# Patient Record
Sex: MALE | Race: WHITE | NOT HISPANIC OR LATINO | Employment: OTHER | URBAN - METROPOLITAN AREA
[De-identification: names, ages, dates, MRNs, and addresses within clinical notes are randomized per-mention and may not be internally consistent; named-entity substitution may affect disease eponyms.]

---

## 2018-04-29 ENCOUNTER — HOSPITAL ENCOUNTER (EMERGENCY)
Facility: HOSPITAL | Age: 53
Discharge: HOME | End: 2018-04-29
Attending: EMERGENCY MEDICINE | Admitting: EMERGENCY MEDICINE

## 2018-04-29 VITALS
HEIGHT: 70 IN | TEMPERATURE: 97.9 F | DIASTOLIC BLOOD PRESSURE: 103 MMHG | RESPIRATION RATE: 16 BRPM | WEIGHT: 195 LBS | HEART RATE: 75 BPM | OXYGEN SATURATION: 96 % | SYSTOLIC BLOOD PRESSURE: 165 MMHG | BODY MASS INDEX: 27.92 KG/M2

## 2018-04-29 DIAGNOSIS — K08.89 PAIN, DENTAL: Primary | ICD-10-CM

## 2018-04-29 PROCEDURE — 96372 THER/PROPH/DIAG INJ SC/IM: CPT

## 2018-04-29 PROCEDURE — 25010000002 KETOROLAC TROMETHAMINE PER 15 MG: Performed by: NURSE PRACTITIONER

## 2018-04-29 PROCEDURE — 99284 EMERGENCY DEPT VISIT MOD MDM: CPT

## 2018-04-29 RX ORDER — KETOROLAC TROMETHAMINE 30 MG/ML
60 INJECTION, SOLUTION INTRAMUSCULAR; INTRAVENOUS ONCE
Status: COMPLETED | OUTPATIENT
Start: 2018-04-29 | End: 2018-04-29

## 2018-04-29 RX ORDER — FLUOXETINE HYDROCHLORIDE 20 MG/1
20 CAPSULE ORAL DAILY
COMMUNITY

## 2018-04-29 RX ADMIN — KETOROLAC TROMETHAMINE 60 MG: 30 INJECTION, SOLUTION INTRAMUSCULAR at 21:28

## 2018-04-29 RX ADMIN — LIDOCAINE HYDROCHLORIDE 10 ML: 20 SOLUTION ORAL; TOPICAL at 21:24

## 2018-04-29 RX ADMIN — TOPICAL ANESTHETIC 1 SPRAY: 200 SPRAY DENTAL; PERIODONTAL at 21:45

## 2023-02-08 ENCOUNTER — HOSPITAL ENCOUNTER (EMERGENCY)
Facility: HOSPITAL | Age: 58
Discharge: HOME | End: 2023-02-08
Attending: EMERGENCY MEDICINE | Admitting: EMERGENCY MEDICINE
Payer: COMMERCIAL

## 2023-02-08 ENCOUNTER — APPOINTMENT (OUTPATIENT)
Dept: GENERAL RADIOLOGY | Facility: HOSPITAL | Age: 58
End: 2023-02-08
Payer: COMMERCIAL

## 2023-02-08 VITALS
BODY MASS INDEX: 27.92 KG/M2 | DIASTOLIC BLOOD PRESSURE: 99 MMHG | SYSTOLIC BLOOD PRESSURE: 137 MMHG | WEIGHT: 195 LBS | OXYGEN SATURATION: 96 % | HEART RATE: 83 BPM | TEMPERATURE: 98.5 F | RESPIRATION RATE: 20 BRPM | HEIGHT: 70 IN

## 2023-02-08 DIAGNOSIS — S46.219A BICEPS TENDON TEAR: Primary | ICD-10-CM

## 2023-02-08 PROCEDURE — 99283 EMERGENCY DEPT VISIT LOW MDM: CPT

## 2023-02-08 PROCEDURE — 73070 X-RAY EXAM OF ELBOW: CPT

## 2023-02-08 RX ORDER — HYDROCODONE BITARTRATE AND ACETAMINOPHEN 5; 325 MG/1; MG/1
1 TABLET ORAL EVERY 6 HOURS PRN
Qty: 12 TABLET | Refills: 0 | Status: SHIPPED | OUTPATIENT
Start: 2023-02-08

## 2023-02-08 NOTE — ED PROVIDER NOTES
Subjective   History of Present Illness  57-year-old male presents emergency today lifting a door which was not exceptionally heavy but felt a pop in his left bicep area.  He had no direct trauma.  Patient reports this was done exceptionally heavy piece of equipment.  He is having spasm in the left bicep area.  He is unable to fully straighten out the left arm.  He is right-hand dominant.  As far as he knows he is never been on fluoroquinolone.  He has not been on steroids.    History provided by:  Patient   used: No    Arm Injury  Location:  Elbow  Elbow location:  L elbow  Injury: no    Pain details:     Quality:  Burning and cramping    Radiates to:  Does not radiate    Severity:  Moderate    Onset quality:  Sudden    Timing:  Constant    Progression:  Unchanged  Handedness:  Right-handed  Dislocation: no    Foreign body present:  No foreign bodies  Tetanus status:  Up to date  Prior injury to area:  No  Relieved by:  Rest  Worsened by:  Nothing  Ineffective treatments:  None tried  Associated symptoms: no back pain, no decreased range of motion, no fatigue, no fever, no muscle weakness, no neck pain, no numbness, no swelling and no tingling    Risk factors: no concern for non-accidental trauma        Review of Systems   Constitutional: Negative for fatigue and fever.   Respiratory: Negative for chest tightness, wheezing and stridor.    Cardiovascular: Negative for chest pain and palpitations.   Genitourinary: Negative for dysuria, frequency and urgency.   Musculoskeletal: Negative for back pain and neck pain.   Skin: Negative for pallor and rash.   Psychiatric/Behavioral: Negative.    All other systems reviewed and are negative.      Past Medical History:   Diagnosis Date   • Anxiety    • Depression        No Known Allergies    No past surgical history on file.    No family history on file.    Social History     Socioeconomic History   • Marital status: Single   Tobacco Use   • Smoking  status: Former   • Smokeless tobacco: Never   • Tobacco comments:     quit 5 years ago   Vaping Use   • Vaping Use: Never used   Substance and Sexual Activity   • Alcohol use: No     Comment: quit 6 years ago - recovering alcoholic   • Drug use: No   • Sexual activity: Defer           Objective   Physical Exam  Vitals and nursing note reviewed.   Constitutional:       Appearance: He is well-developed.   HENT:      Head: Normocephalic and atraumatic.      Right Ear: External ear normal.      Left Ear: External ear normal.      Nose: Nose normal.   Eyes:      General: No scleral icterus.     Conjunctiva/sclera: Conjunctivae normal.      Pupils: Pupils are equal, round, and reactive to light.   Neck:      Thyroid: No thyromegaly.   Cardiovascular:      Rate and Rhythm: Normal rate and regular rhythm.      Heart sounds: Normal heart sounds.   Pulmonary:      Effort: Pulmonary effort is normal. No respiratory distress.      Breath sounds: Normal breath sounds. No wheezing or rales.   Chest:      Chest wall: No tenderness.   Abdominal:      General: Bowel sounds are normal. There is no distension.      Palpations: Abdomen is soft.      Tenderness: There is no abdominal tenderness.   Musculoskeletal:         General: Normal range of motion.      Cervical back: Normal range of motion.   Lymphadenopathy:      Cervical: No cervical adenopathy.   Skin:     General: Skin is warm and dry.   Neurological:      Mental Status: He is alert and oriented to person, place, and time.      Cranial Nerves: No cranial nerve deficit.      Coordination: Coordination normal.      Deep Tendon Reflexes: Reflexes are normal and symmetric. Reflexes normal.   Psychiatric:         Behavior: Behavior normal.         Thought Content: Thought content normal.         Judgment: Judgment normal.         Procedures           ED Course                                 No results found for this or any previous visit (from the past 24 hour(s)).  Note: In  "addition to lab results from this visit, the labs listed above may include labs taken at another facility or during a different encounter within the last 24 hours. Please correlate lab times with ED admission and discharge times for further clarification of the services performed during this visit.    XR ELBOW 2 VIEW LEFT   Final Result   Impression:   No acute osseous abnormality      Electronically Signed: Leoncio Kenyon     2/8/2023 5:24 PM EST     Workstation ID: [id number]        Vitals:    02/08/23 1603   BP: 137/99   BP Location: Right arm   Patient Position: Sitting   Pulse: 83   Resp: 20   Temp: 98.5 °F (36.9 °C)   TempSrc: Oral   SpO2: 96%   Weight: 88.5 kg (195 lb)   Height: 177.8 cm (70\")     Medications - No data to display  ECG/EMG Results (last 24 hours)     ** No results found for the last 24 hours. **        No orders to display         GIULIANO reviewed by Jitendra Nelson MD       Medical Decision Making  Was lifting a pop in the left biceps area.  By examination appears of a biceps tendon tear.  The muscle appears to be balled up towards the proximal humerus.  There is no ecchymosis.  A defect is palpable.  Distal pulses are strong and equal limited range of motion.    Biceps tendon tear: acute illness or injury  Risk  Prescription drug management.          Final diagnoses:   Biceps tendon tear       ED Disposition  ED Disposition     ED Disposition   Discharge    Condition   Stable    Comment   --             Adriel Malloy MD  [address] [facility] [address]  [place] [id number]  [phone]               Medication List      New Prescriptions    HYDROcodone-acetaminophen 5-325 MG per tablet  Commonly known as: NORCO  Take 1 tablet by mouth Every 6 (Six) Hours As Needed for Severe Pain.           Where to Get Your Medications      These medications were sent to [facility] [id number] - [place], [place] - [address] AT [facility] [address] - [phone] PH - [phone] FX "  [address] [facility] [address], [place] [id number]    Phone: [phone]   · HYDROcodone-acetaminophen 5-325 MG per tablet          Dhaval Bennett PA  02/08/23 2105

## 2023-02-20 ENCOUNTER — LAB (OUTPATIENT)
Dept: LAB | Facility: HOSPITAL | Age: 58
End: 2023-02-20
Payer: COMMERCIAL

## 2023-02-20 ENCOUNTER — TRANSCRIBE ORDERS (OUTPATIENT)
Dept: CARDIOLOGY | Facility: HOSPITAL | Age: 58
End: 2023-02-20
Payer: COMMERCIAL

## 2023-02-20 ENCOUNTER — TRANSCRIBE ORDERS (OUTPATIENT)
Dept: LAB | Facility: HOSPITAL | Age: 58
End: 2023-02-20
Payer: COMMERCIAL

## 2023-02-20 ENCOUNTER — TRANSCRIBE ORDERS (OUTPATIENT)
Dept: ADMINISTRATIVE | Facility: HOSPITAL | Age: 58
End: 2023-02-20
Payer: COMMERCIAL

## 2023-02-20 ENCOUNTER — HOSPITAL ENCOUNTER (OUTPATIENT)
Dept: CARDIOLOGY | Facility: HOSPITAL | Age: 58
Discharge: HOME | End: 2023-02-20
Payer: COMMERCIAL

## 2023-02-20 ENCOUNTER — HOSPITAL ENCOUNTER (OUTPATIENT)
Dept: GENERAL RADIOLOGY | Facility: HOSPITAL | Age: 58
Discharge: HOME | End: 2023-02-20
Payer: COMMERCIAL

## 2023-02-20 DIAGNOSIS — Z87.898 PERSONAL HISTORY OF OTHER LYMPHATIC AND HEMATOPOIETIC NEOPLASM: ICD-10-CM

## 2023-02-20 DIAGNOSIS — Z01.811 PRE-OP CHEST EXAM: Primary | ICD-10-CM

## 2023-02-20 DIAGNOSIS — R73.09 IMPAIRED GLUCOSE TOLERANCE TEST: ICD-10-CM

## 2023-02-20 DIAGNOSIS — Z01.818 OTHER SPECIFIED PRE-OPERATIVE EXAMINATION: ICD-10-CM

## 2023-02-20 DIAGNOSIS — R94.31 EKG ABNORMALITIES: Primary | ICD-10-CM

## 2023-02-20 DIAGNOSIS — Z01.811 PRE-OP CHEST EXAM: ICD-10-CM

## 2023-02-20 DIAGNOSIS — Z87.898 PERSONAL HISTORY OF OTHER LYMPHATIC AND HEMATOPOIETIC NEOPLASM: Primary | ICD-10-CM

## 2023-02-20 LAB
ANION GAP SERPL CALCULATED.3IONS-SCNC: 8.7 MMOL/L (ref 5–15)
BUN SERPL-MCNC: 17 MG/DL (ref 6–20)
BUN/CREAT SERPL: 14.3 (ref 7–25)
CALCIUM SPEC-SCNC: 9.9 MG/DL (ref 8.6–10.5)
CHLORIDE SERPL-SCNC: 104 MMOL/L (ref 98–107)
CO2 SERPL-SCNC: 27.3 MMOL/L (ref 22–29)
CREAT SERPL-MCNC: 1.19 MG/DL (ref 0.76–1.27)
DEPRECATED RDW RBC AUTO: 40.8 FL (ref 37–54)
EGFRCR SERPLBLD CKD-EPI 2021: 71.2 ML/MIN/1.73
ERYTHROCYTE [DISTWIDTH] IN BLOOD BY AUTOMATED COUNT: 12.1 % (ref 12.3–15.4)
GLUCOSE SERPL-MCNC: 100 MG/DL (ref 65–99)
HBA1C MFR BLD: 5.4 % (ref 4.8–5.6)
HCT VFR BLD AUTO: 43.9 % (ref 37.5–51)
HGB BLD-MCNC: 15.4 G/DL (ref 13–17.7)
MCH RBC QN AUTO: 32.3 PG (ref 26.6–33)
MCHC RBC AUTO-ENTMCNC: 35.1 G/DL (ref 31.5–35.7)
MCV RBC AUTO: 92 FL (ref 79–97)
PLATELET # BLD AUTO: 283 10*3/MM3 (ref 140–450)
PMV BLD AUTO: 9.9 FL (ref 6–12)
POTASSIUM SERPL-SCNC: 4.6 MMOL/L (ref 3.5–5.2)
RBC # BLD AUTO: 4.77 10*6/MM3 (ref 4.14–5.8)
SODIUM SERPL-SCNC: 140 MMOL/L (ref 136–145)
WBC NRBC COR # BLD: 5.82 10*3/MM3 (ref 3.4–10.8)

## 2023-02-20 PROCEDURE — 36415 COLL VENOUS BLD VENIPUNCTURE: CPT

## 2023-02-20 PROCEDURE — 85027 COMPLETE CBC AUTOMATED: CPT

## 2023-02-20 PROCEDURE — 83036 HEMOGLOBIN GLYCOSYLATED A1C: CPT

## 2023-02-20 PROCEDURE — 71046 X-RAY EXAM CHEST 2 VIEWS: CPT

## 2023-02-20 PROCEDURE — 93005 ELECTROCARDIOGRAM TRACING: CPT

## 2023-02-20 PROCEDURE — 80048 BASIC METABOLIC PNL TOTAL CA: CPT

## 2023-02-22 LAB
QT INTERVAL: 388 MS
QTC INTERVAL: 430 MS

## 2023-03-02 DIAGNOSIS — S46.212A RUPTURE OF LEFT DISTAL BICEPS TENDON, INITIAL ENCOUNTER: Primary | ICD-10-CM

## 2023-03-02 RX ORDER — DOCUSATE SODIUM 100 MG/1
100 CAPSULE, LIQUID FILLED ORAL 2 TIMES DAILY
Qty: 20 CAPSULE | Refills: 0 | Status: SHIPPED | OUTPATIENT
Start: 2023-03-02

## 2023-03-02 RX ORDER — HYDROCODONE BITARTRATE AND ACETAMINOPHEN 7.5; 325 MG/1; MG/1
1 TABLET ORAL EVERY 4 HOURS PRN
Qty: 24 TABLET | Refills: 0 | Status: SHIPPED | OUTPATIENT
Start: 2023-03-02

## 2023-03-02 RX ORDER — ONDANSETRON 4 MG/1
4 TABLET, FILM COATED ORAL EVERY 8 HOURS PRN
Qty: 12 TABLET | Refills: 0 | Status: SHIPPED | OUTPATIENT
Start: 2023-03-02

## 2023-07-24 ENCOUNTER — TREATMENT (OUTPATIENT)
Dept: PHYSICAL THERAPY | Facility: CLINIC | Age: 58
End: 2023-07-24
Payer: COMMERCIAL

## 2023-07-24 DIAGNOSIS — G89.29 CHRONIC LOW BACK PAIN WITH LEFT-SIDED SCIATICA, UNSPECIFIED BACK PAIN LATERALITY: ICD-10-CM

## 2023-07-24 DIAGNOSIS — M54.42 CHRONIC LOW BACK PAIN WITH LEFT-SIDED SCIATICA, UNSPECIFIED BACK PAIN LATERALITY: ICD-10-CM

## 2023-07-24 DIAGNOSIS — M48.061 SPINAL STENOSIS OF LUMBAR REGION, UNSPECIFIED WHETHER NEUROGENIC CLAUDICATION PRESENT: Primary | ICD-10-CM

## 2023-07-24 PROCEDURE — G0283 ELEC STIM OTHER THAN WOUND: HCPCS | Performed by: PHYSICAL THERAPIST

## 2023-07-24 PROCEDURE — 97110 THERAPEUTIC EXERCISES: CPT | Performed by: PHYSICAL THERAPIST

## 2023-07-24 PROCEDURE — 97012 MECHANICAL TRACTION THERAPY: CPT | Performed by: PHYSICAL THERAPIST

## 2023-07-24 NOTE — PROGRESS NOTES
Physical Therapy Daily Treatment Note  [facility] [place]  [address]  [facility] [address]      Patient: Nicko Smith   : 1965  Diagnosis/ICD-10 Code:  Spinal stenosis of lumbar region, unspecified whether neurogenic claudication present [M48.061]  Referring practitioner: No ref. provider found  Date of Initial Visit: Type: THERAPY  Noted: 2023  Today's Date: 2023  Patient seen for 3 sessions         Visit Diagnoses:    ICD-10-CM ICD-9-CM   1. Spinal stenosis of lumbar region, unspecified whether neurogenic claudication present  M48.061 724.02   2. Chronic low back pain with left-sided sciatica, unspecified back pain laterality  M54.42 724.2    G89.29 724.3     338.29       Subjective   Nicko Smith reports: overall back is worsening, pain down both legs, but not past knees.  Still cannot push off with left foot.    Objective   OBSERVATION: atrophy of left calf.  GAIT: heel strike on left, but no toe off, has to rotate left leg.  TEST: RFIL=no pain during activity, peripheralization after standing. Prone on elbow: abolishes pain in this position, but when he stood pain increased in lumbar and down bilateral LE.  Prone LE traction, centralization of symptoms, and less painful when he stood up and walk.  See Exercise, Manual, and Modality Logs for complete treatment.       Assessment/Plan  Does not have an extension or flexion preference at this time.  End ranges of both flexion and extension peripheralization of symptoms.  Responds well to prone traction. Suspect a lumbar disk derangement and/or foraminal stenosis.  Need to send an update to his referring provider.  Progress per Plan of Care and Progress strengthening /stabilization /functional activity           Timed:         Manual Therapy:         mins  07081;     Therapeutic Exercise:    15     mins  25163;     Neuromuscular Kaite:        mins  55791;    Therapeutic Activity:          mins  45612;     Gait  Training:           mins  96751;     Ultrasound:          mins  24982;    Ionto                                   mins   66445  Self Care                            mins   55874  Canalith Repos         mins 03469      Un-Timed:  Electrical Stimulation:    30     mins  53446 ( );  Dry Needling          mins self-pay  Traction     20     mins 91326      Timed Treatment:   15   mins   Total Treatment:     65   mins    Johnathon Buck, PT  [place] License: [id number]

## 2023-07-24 NOTE — LETTER
Progress Note  2023  MARIO Caba    Re: Nicko Smith : 1965  ________________________________________________________________    Nicko Smith, has attended 3/3 PT sessions.  Treatment has consisted of: Lumbar AROM/AAROM/Stretching, lumbar manual mobilization, traction, modalities.    S:  Nicko Smith states: Feels like back and leg symptoms progressively worsening.  Still has no strength in left leg to push off with.    O:  OBSERVATION: Atrophy of left calf.  AROM: Lumbar   Flexion: 95 (More painful to come up) degrees   Extension: 25 degrees, end range pain with increase in LE symptoms.  Left lateral flexion: 30 degrees   Right lateral flexion: 35 degrees     MMT: Left calf 2+/5  DTR's/SENSATION: Left achilles sluggish.  SPECIAL TESTS:  Lumbar Quadrant test positive to left.  ACTIVITY TOLERANCE: The longer he stands up right the more severe his LE symptoms worsen.     A: Patient does not a direction of preference for centralization of symptoms, both flexion and extension causes peripheralization of symptoms. Traction of spine helps for short period time.  Pt may benefit from more advance imaging, or injection therapies.  I hope this information helps.      P: Please advise after your exam.    Thank you for this referral.    Johnathon Buck, PT

## 2023-07-26 ENCOUNTER — TREATMENT (OUTPATIENT)
Dept: PHYSICAL THERAPY | Facility: CLINIC | Age: 58
End: 2023-07-26
Payer: COMMERCIAL

## 2023-07-26 DIAGNOSIS — M48.061 SPINAL STENOSIS OF LUMBAR REGION, UNSPECIFIED WHETHER NEUROGENIC CLAUDICATION PRESENT: Primary | ICD-10-CM

## 2023-07-26 DIAGNOSIS — M54.42 CHRONIC LOW BACK PAIN WITH LEFT-SIDED SCIATICA, UNSPECIFIED BACK PAIN LATERALITY: ICD-10-CM

## 2023-07-26 DIAGNOSIS — G89.29 CHRONIC LOW BACK PAIN WITH LEFT-SIDED SCIATICA, UNSPECIFIED BACK PAIN LATERALITY: ICD-10-CM

## 2023-07-26 NOTE — PROGRESS NOTES
Physical Therapy Daily Treatment Note  [facility] [place] [facility] [place]  [address]  [facility] [address]      Patient: Nicko Smith   : 1965  Diagnosis/ICD-10 Code:  Spinal stenosis of lumbar region, unspecified whether neurogenic claudication present [M48.061]  Referring practitioner: No ref. provider found  Date of Initial Visit: Type: THERAPY  Noted: 2023  Today's Date: 2023  Patient seen for 4 sessions         Visit Diagnoses:    ICD-10-CM ICD-9-CM   1. Spinal stenosis of lumbar region, unspecified whether neurogenic claudication present  M48.061 724.02   2. Chronic low back pain with left-sided sciatica, unspecified back pain laterality  M54.42 724.2    G89.29 724.3     338.29       Subjective   Nicko Smith reports: no big change up to this point, has not been back to see MD. Back pain is the biggest issue.    Objective   GAIT: antalgic to the left  OBSERVATION: atrophy left calf.  OTHER: end range extension and flexion causes peripheralization.  Repeated motion without reaching end range tolerated well with some improvement.  See Exercise, Manual, and Modality Logs for complete treatment.       Assessment/Plan  Seems to do well with quadraped flexion into extension.  Manual therapy tolerated, want to see how he did with soft tissue treatment.  Progress per Plan of Care and Progress strengthening /stabilization /functional activity           Timed:         Manual Therapy:    17     mins  00355;     Therapeutic Exercise:    15     mins  21537;     Neuromuscular Katie:        mins  21677;    Therapeutic Activity:     16     mins  87463;     Gait Training:           mins  43256;     Ultrasound:          mins  02581;    Ionto                                   mins   31208  Self Care                            mins   41445  Canalith Repos         mins 46250      Un-Timed:  Electrical Stimulation:    20     mins  72152 ( );  Dry Needling          mins self-pay  Traction           mins 12077      Timed Treatment:   48   mins   Total Treatment:     68   mins    Johnathon Buck, PT  [place] License: [id number]

## 2023-08-09 ENCOUNTER — TREATMENT (OUTPATIENT)
Dept: PHYSICAL THERAPY | Facility: CLINIC | Age: 58
End: 2023-08-09
Payer: COMMERCIAL

## 2023-08-09 DIAGNOSIS — M54.42 CHRONIC LOW BACK PAIN WITH LEFT-SIDED SCIATICA, UNSPECIFIED BACK PAIN LATERALITY: ICD-10-CM

## 2023-08-09 DIAGNOSIS — M48.061 SPINAL STENOSIS OF LUMBAR REGION, UNSPECIFIED WHETHER NEUROGENIC CLAUDICATION PRESENT: Primary | ICD-10-CM

## 2023-08-09 DIAGNOSIS — G89.29 CHRONIC LOW BACK PAIN WITH LEFT-SIDED SCIATICA, UNSPECIFIED BACK PAIN LATERALITY: ICD-10-CM

## 2023-08-09 PROCEDURE — G0283 ELEC STIM OTHER THAN WOUND: HCPCS | Performed by: PHYSICAL THERAPIST

## 2023-08-09 PROCEDURE — 97110 THERAPEUTIC EXERCISES: CPT | Performed by: PHYSICAL THERAPIST

## 2023-08-09 PROCEDURE — 97140 MANUAL THERAPY 1/> REGIONS: CPT | Performed by: PHYSICAL THERAPIST

## 2023-08-10 NOTE — PROGRESS NOTES
Physical Therapy Daily Treatment Note  [facility] [place]  [address]  [facility] [address]      Patient: Nicko Smith   : 1965  Diagnosis/ICD-10 Code:  Spinal stenosis of lumbar region, unspecified whether neurogenic claudication present [M48.061]  Referring practitioner: MARIO Caba  Date of Initial Visit: Type: THERAPY  Noted: 2023  Today's Date: 2023  Patient seen for 5 sessions         Visit Diagnoses:    ICD-10-CM ICD-9-CM   1. Spinal stenosis of lumbar region, unspecified whether neurogenic claudication present  M48.061 724.02   2. Chronic low back pain with left-sided sciatica, unspecified back pain laterality  M54.42 724.2    G89.29 724.3     338.29       Subjective   Nicko Smith reports: Press ups really causes a lot of leg pain, too much lumbar flexion causes leg pain too.    Objective   OBSERVATION: atrophy left calf  GAIT: cannot push off with left foot.  PALPATION: tender L4 to S1 left > right.  RMT: if he slowly goes at REIL he can increase his extension ROM, but cannot get to end range without symptoms exacerbation.    See Exercise, Manual, and Modality Logs for complete treatment.       Assessment/Plan  Flexion rotation tolerated well, cannot go to end range lumbar extension. I suspect a neural foraminal stenosis of some level.  He has to do most activities in a neutral position. Lumbar distraction is helpful.  Progress per Plan of Care and Progress strengthening /stabilization /functional activity           Timed:         Manual Therapy:    13     mins  24769;     Therapeutic Exercise:    25     mins  88146;     Neuromuscular Katie:        mins  97291;    Therapeutic Activity:          mins  35355;     Gait Training:           mins  59330;     Ultrasound:          mins  22278;    Ionto                                   mins   32905  Self Care                            mins   82690  [facility]         mins  46819      Un-Timed:  Electrical Stimulation:    20     mins  29838 ( );  Dry Needling          mins self-pay  Traction          mins 99480      Timed Treatment:   38   mins   Total Treatment:     58   mins    Johnathon Buck, PT  [place] License: [id number]

## 2023-08-15 ENCOUNTER — TREATMENT (OUTPATIENT)
Dept: PHYSICAL THERAPY | Facility: CLINIC | Age: 58
End: 2023-08-15
Payer: COMMERCIAL

## 2023-08-15 DIAGNOSIS — G89.29 CHRONIC LOW BACK PAIN WITH LEFT-SIDED SCIATICA, UNSPECIFIED BACK PAIN LATERALITY: ICD-10-CM

## 2023-08-15 DIAGNOSIS — M48.061 SPINAL STENOSIS OF LUMBAR REGION, UNSPECIFIED WHETHER NEUROGENIC CLAUDICATION PRESENT: Primary | ICD-10-CM

## 2023-08-15 DIAGNOSIS — M54.42 CHRONIC LOW BACK PAIN WITH LEFT-SIDED SCIATICA, UNSPECIFIED BACK PAIN LATERALITY: ICD-10-CM

## 2023-08-15 PROCEDURE — G0283 ELEC STIM OTHER THAN WOUND: HCPCS | Performed by: PHYSICAL THERAPIST

## 2023-08-15 PROCEDURE — 97140 MANUAL THERAPY 1/> REGIONS: CPT | Performed by: PHYSICAL THERAPIST

## 2023-08-15 PROCEDURE — 97110 THERAPEUTIC EXERCISES: CPT | Performed by: PHYSICAL THERAPIST

## 2023-08-17 NOTE — PROGRESS NOTES
Physical Therapy Daily Treatment Note  [facility] [place] [place]  [address]  [facility] [address]      Patient: Nicko Smith   : 1965  Diagnosis/ICD-10 Code:  Spinal stenosis of lumbar region, unspecified whether neurogenic claudication present [M48.061]  Referring practitioner: MARIO Caba  Date of Initial Visit: Type: THERAPY  Noted: 2023  Today's Date: 8/15/2023  Patient seen for 6 sessions         Visit Diagnoses:    ICD-10-CM ICD-9-CM   1. Spinal stenosis of lumbar region, unspecified whether neurogenic claudication present  M48.061 724.02   2. Chronic low back pain with left-sided sciatica, unspecified back pain laterality  M54.42 724.2    G89.29 724.3     338.29       Subjective   Nicko Smith reports: back pain is about the same, radiating pain into leg about the same. But the ability to push off with left foot is improving some, can feel the foot starting push down now, still very weak.  Flexibility has improved.    Objective   OBSERVATION: atrophy left calf  GAIT: pt left LE not ER as it has been past few weeks.  PALPATION: tender L4 to S1 left > right.  Lumbar paraspinal and quadratus lumborum guarding bilaterally.  OTHER:  pt reports decreased pain with lumbar distraction.      See Exercise, Manual, and Modality Logs for complete treatment.       Assessment/Plan  Lumbar distraction is helpful.  Able to do repeated unloaded squatting was less painful today, this improvement.  Still suspect lower lumbar derangement.  Progress per Plan of Care and Progress strengthening /stabilization /functional activity           Timed:         Manual Therapy:    13     mins  08588;     Therapeutic Exercise:    26     mins  30841;     Neuromuscular Katie:        mins  43582;    Therapeutic Activity:          mins  11954;     Gait Training:           mins  42720;     Ultrasound:          mins  21845;    Ionto                                   mins   94095  Self Care                             mins   03978  [facility]         mins 92439      Un-Timed:  Electrical Stimulation:    20     mins  97148 ( );  Dry Needling          mins self-pay  Traction          mins 29798      Timed Treatment:   39   mins   Total Treatment:     59   mins    Johnathon Buck, PT  [place] License: [id number]

## 2023-10-09 ENCOUNTER — HOSPITAL ENCOUNTER (OUTPATIENT)
Dept: GENERAL RADIOLOGY | Facility: HOSPITAL | Age: 58
Discharge: HOME | End: 2023-10-09
Admitting: NEUROLOGICAL SURGERY
Payer: COMMERCIAL

## 2023-10-09 ENCOUNTER — OFFICE VISIT (OUTPATIENT)
Dept: NEUROSURGERY | Facility: CLINIC | Age: 58
End: 2023-10-09
Payer: COMMERCIAL

## 2023-10-09 VITALS
DIASTOLIC BLOOD PRESSURE: 96 MMHG | SYSTOLIC BLOOD PRESSURE: 144 MMHG | HEIGHT: 70 IN | WEIGHT: 188 LBS | BODY MASS INDEX: 26.92 KG/M2

## 2023-10-09 DIAGNOSIS — M43.16 SPONDYLOLISTHESIS AT L3-L4 LEVEL: Primary | ICD-10-CM

## 2023-10-09 DIAGNOSIS — M43.16 SPONDYLOLISTHESIS AT L3-L4 LEVEL: ICD-10-CM

## 2023-10-09 PROCEDURE — 72110 X-RAY EXAM L-2 SPINE 4/>VWS: CPT

## 2023-10-09 PROCEDURE — 99204 OFFICE O/P NEW MOD 45 MIN: CPT | Performed by: NEUROLOGICAL SURGERY

## 2023-10-09 RX ORDER — CHLORHEXIDINE GLUCONATE 4 G/100ML
SOLUTION TOPICAL
Qty: 120 ML | Refills: 0 | Status: SHIPPED | OUTPATIENT
Start: 2023-10-09

## 2023-10-09 RX ORDER — APIXABAN 2.5 MG/1
TABLET, FILM COATED ORAL
COMMUNITY
Start: 2023-09-28

## 2023-10-09 RX ORDER — SODIUM CHLORIDE 0.9 % (FLUSH) 0.9 %
10 SYRINGE (ML) INJECTION AS NEEDED
OUTPATIENT
Start: 2023-10-09

## 2023-10-09 RX ORDER — SODIUM CHLORIDE 0.9 % (FLUSH) 0.9 %
10 SYRINGE (ML) INJECTION EVERY 12 HOURS SCHEDULED
OUTPATIENT
Start: 2023-10-09

## 2023-10-09 RX ORDER — OXYCODONE HCL 10 MG/1
10 TABLET, FILM COATED, EXTENDED RELEASE ORAL ONCE
OUTPATIENT
Start: 2023-10-09 | End: 2023-10-09

## 2023-10-09 RX ORDER — SODIUM CHLORIDE 9 MG/ML
40 INJECTION, SOLUTION INTRAVENOUS AS NEEDED
OUTPATIENT
Start: 2023-10-09

## 2023-10-09 RX ORDER — CYCLOBENZAPRINE HCL 10 MG
TABLET ORAL
COMMUNITY
Start: 2023-08-21

## 2023-10-09 RX ORDER — IBUPROFEN 200 MG
800 TABLET ORAL ONCE
OUTPATIENT
Start: 2023-10-09 | End: 2023-10-09

## 2023-10-09 RX ORDER — PREGABALIN 75 MG/1
150 CAPSULE ORAL ONCE
OUTPATIENT
Start: 2023-10-09 | End: 2023-10-09

## 2023-10-09 RX ORDER — ACETAMINOPHEN 325 MG/1
1000 TABLET ORAL ONCE
OUTPATIENT
Start: 2023-10-09 | End: 2023-10-09

## 2023-10-09 NOTE — PROGRESS NOTES
"Subjective     Chief Complaint: Back pain, left leg pain, left leg weakness    Patient ID: Nicko Smith is a 58 y.o. male seen for consultation today at the request of  MARIO Caba    History of Present Illness    This is a 58-year-old man who presents to my office with chief complaints of a multiyear history of low back pain with radiating pain and weakness into his left lower extremity.  He has tried home exercises including stretching and yoga without relief.  He has failed oral anti-inflammatories.  He denies bladder/bowel incontinence.  He works as an independent umana.  He does not exercise regularly.  He denies alcohol, or tobacco abuse.  He has never had back surgery.    The following portions of the patient's history were reviewed and updated as appropriate: allergies, current medications, past family history, past medical history, past social history, past surgical history and problem list.    Family history: History reviewed. No pertinent family history.    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  Body mass index is 26.98 kg/mý.    Physical Exam  Vitals and nursing note reviewed.   Constitutional:       Appearance: He is well-developed. He is not toxic-appearing.   HENT:      Head: Normocephalic and atraumatic.      Right Ear: Hearing normal.      Left Ear: Hearing normal.      Nose: Nose normal.   Eyes:      General: Lids are normal.      Conjunctiva/sclera: Conjunctivae normal.      Pupils: Pupils are equal, round, and reactive to light.   Neck:    "   Vascular: No JVD.   Cardiovascular:      Rate and Rhythm: Normal rate and regular rhythm.      Pulses:           Radial pulses are 2+ on the right side and 2+ on the left side.   Pulmonary:      Effort: Pulmonary effort is normal. No respiratory distress.      Breath sounds: No stridor. No wheezing.   Musculoskeletal:      Cervical back: Normal range of motion.      Thoracic back: No swelling, spasms or tenderness.      Lumbar back: No swelling, spasms, tenderness or bony tenderness.      Comments: Muscle Group    L          R  Hip Flexor          5          5  Knee Extensor  5          5  ADF                   5          5  APF                   5          5  EHL                   5          5   Skin:     General: Skin is warm and dry.      Findings: No erythema or rash.   Neurological:      Mental Status: He is alert and oriented to person, place, and time.      GCS: GCS eye subscore is 4. GCS verbal subscore is 5. GCS motor subscore is 6.      Cranial Nerves: No cranial nerve deficit.      Sensory: No sensory deficit.      Motor: No abnormal muscle tone.      Deep Tendon Reflexes: Reflexes are normal and symmetric. Reflexes abnormal.      Reflex Scores:       Tricep reflexes are 2+ on the right side and 2+ on the left side.       Bicep reflexes are 2+ on the right side and 2+ on the left side.       Brachioradialis reflexes are 2+ on the right side and 2+ on the left side.       Patellar reflexes are 2+ on the right side and 2+ on the left side.       Achilles reflexes are 2+ on the right side and 0 on the left side.     Comments: Casual, toe raised, and tandem gait testing are performed unremarkably.  He has a significant left gastrocnemius weakness.  Ankle jerk is absent on the left side.  Station is intact.   Psychiatric:         Behavior: Behavior normal.         Thought Content: Thought content normal.         Judgment: Judgment normal.         Assessment & Plan     Independent Review of Radiographic  Studies:      Available for my review is a MRI of the lumbar spine which was performed on 9/1/2023.  There is severe degenerative disc disease most notably at L4-5 and L5-S1.  At L3-4, there is a broad-based, paracentral disc extrusion which causes severe central canal stenosis.  There is bunching of the nerve roots proximal to the L3-4 interspace.  There is anterolisthesis of L3 on L4 which measures approximately 2-3 mm.  There is severe facet hypertrophy present at L3-4, L4-5, and L5-S1.    Medical Decision Making:      This is a 58-year-old man with clinical and radiographic evidence of severe central canal stenosis at L3-4.  I have ordered flexion-extension films.  Based on the spondylolisthesis seen at L3-4 on the MRI, my recommendation is for L3-4 PLIF.  Informed consent for this procedure was obtained from the patient.  He acknowledges the attendant risks and benefits.  I did discuss the use of bone morphogenic protein with him.  I ordered flexion-extension films on his lumbar spine and we will schedule him on an elective basis in the near future.    Diagnoses and all orders for this visit:    1. Spondylolisthesis at L3-L4 level (Primary)  -     XR Spine Lumbar AP & Lateral With Flex & Ext; Future    Other orders  -     MRI outside films; Future        No follow-ups on file.           This document signed by LOBO Lu MD October 9, 2023 09:40 EDT

## 2024-06-27 ENCOUNTER — PATIENT ROUNDING (BHMG ONLY) (OUTPATIENT)
Dept: URGENT CARE | Facility: CLINIC | Age: 59
End: 2024-06-27
Payer: COMMERCIAL

## 2024-07-22 ENCOUNTER — PATIENT ROUNDING (BHMG ONLY) (OUTPATIENT)
Dept: URGENT CARE | Facility: CLINIC | Age: 59
End: 2024-07-22
Payer: COMMERCIAL

## 2024-08-12 ENCOUNTER — HOSPITAL ENCOUNTER (EMERGENCY)
Facility: HOSPITAL | Age: 59
Discharge: HOME | End: 2024-08-12
Attending: EMERGENCY MEDICINE | Admitting: EMERGENCY MEDICINE
Payer: COMMERCIAL

## 2024-08-12 ENCOUNTER — APPOINTMENT (OUTPATIENT)
Dept: CT IMAGING | Facility: HOSPITAL | Age: 59
End: 2024-08-12
Payer: COMMERCIAL

## 2024-08-12 VITALS
HEIGHT: 70 IN | BODY MASS INDEX: 27.21 KG/M2 | OXYGEN SATURATION: 94 % | WEIGHT: 190.04 LBS | RESPIRATION RATE: 22 BRPM | DIASTOLIC BLOOD PRESSURE: 98 MMHG | TEMPERATURE: 97.9 F | SYSTOLIC BLOOD PRESSURE: 139 MMHG | HEART RATE: 81 BPM

## 2024-08-12 DIAGNOSIS — R79.89 ABNORMAL LFTS: ICD-10-CM

## 2024-08-12 DIAGNOSIS — R10.84 ACUTE GENERALIZED ABDOMINAL PAIN: Primary | ICD-10-CM

## 2024-08-12 DIAGNOSIS — R11.2 NAUSEA AND VOMITING, UNSPECIFIED VOMITING TYPE: ICD-10-CM

## 2024-08-12 LAB
ALBUMIN SERPL-MCNC: 4.8 G/DL (ref 3.5–5.2)
ALBUMIN/GLOB SERPL: 1.4 G/DL
ALP SERPL-CCNC: 87 U/L (ref 39–117)
ALT SERPL W P-5'-P-CCNC: 67 U/L (ref 1–41)
ANION GAP SERPL CALCULATED.3IONS-SCNC: 11 MMOL/L (ref 5–15)
AST SERPL-CCNC: 49 U/L (ref 1–40)
BACTERIA UR QL AUTO: NORMAL /HPF
BASOPHILS # BLD AUTO: 0.06 10*3/MM3 (ref 0–0.2)
BASOPHILS NFR BLD AUTO: 0.5 % (ref 0–1.5)
BILIRUB SERPL-MCNC: 0.7 MG/DL (ref 0–1.2)
BILIRUB UR QL STRIP: NEGATIVE
BUN SERPL-MCNC: 21 MG/DL (ref 6–20)
BUN/CREAT SERPL: 16 (ref 7–25)
CALCIUM SPEC-SCNC: 10.2 MG/DL (ref 8.6–10.5)
CHLORIDE SERPL-SCNC: 100 MMOL/L (ref 98–107)
CLARITY UR: CLEAR
CO2 SERPL-SCNC: 28 MMOL/L (ref 22–29)
COLOR UR: YELLOW
CREAT SERPL-MCNC: 1.31 MG/DL (ref 0.76–1.27)
D-LACTATE SERPL-SCNC: 1 MMOL/L (ref 0.5–2)
D-LACTATE SERPL-SCNC: 2.2 MMOL/L (ref 0.5–2)
DEPRECATED RDW RBC AUTO: 38.5 FL (ref 37–54)
EGFRCR SERPLBLD CKD-EPI 2021: 62.7 ML/MIN/1.73
EOSINOPHIL # BLD AUTO: 0.05 10*3/MM3 (ref 0–0.4)
EOSINOPHIL NFR BLD AUTO: 0.4 % (ref 0.3–6.2)
ERYTHROCYTE [DISTWIDTH] IN BLOOD BY AUTOMATED COUNT: 11.6 % (ref 12.3–15.4)
GLOBULIN UR ELPH-MCNC: 3.5 GM/DL
GLUCOSE SERPL-MCNC: 148 MG/DL (ref 65–99)
GLUCOSE UR STRIP-MCNC: NEGATIVE MG/DL
HCT VFR BLD AUTO: 45.2 % (ref 37.5–51)
HGB BLD-MCNC: 15.9 G/DL (ref 13–17.7)
HGB UR QL STRIP.AUTO: NEGATIVE
HOLD SPECIMEN: NORMAL
HYALINE CASTS UR QL AUTO: NORMAL /LPF
IMM GRANULOCYTES # BLD AUTO: 0.04 10*3/MM3 (ref 0–0.05)
IMM GRANULOCYTES NFR BLD AUTO: 0.4 % (ref 0–0.5)
KETONES UR QL STRIP: NEGATIVE
LEUKOCYTE ESTERASE UR QL STRIP.AUTO: NEGATIVE
LIPASE SERPL-CCNC: 70 U/L (ref 13–60)
LYMPHOCYTES # BLD AUTO: 1.68 10*3/MM3 (ref 0.7–3.1)
LYMPHOCYTES NFR BLD AUTO: 14.9 % (ref 19.6–45.3)
MCH RBC QN AUTO: 31.9 PG (ref 26.6–33)
MCHC RBC AUTO-ENTMCNC: 35.2 G/DL (ref 31.5–35.7)
MCV RBC AUTO: 90.6 FL (ref 79–97)
MONOCYTES # BLD AUTO: 0.52 10*3/MM3 (ref 0.1–0.9)
MONOCYTES NFR BLD AUTO: 4.6 % (ref 5–12)
NEUTROPHILS NFR BLD AUTO: 79.2 % (ref 42.7–76)
NEUTROPHILS NFR BLD AUTO: 8.89 10*3/MM3 (ref 1.7–7)
NITRITE UR QL STRIP: NEGATIVE
NRBC BLD AUTO-RTO: 0 /100 WBC (ref 0–0.2)
PH UR STRIP.AUTO: 7 [PH] (ref 5–8)
PLATELET # BLD AUTO: 332 10*3/MM3 (ref 140–450)
PMV BLD AUTO: 9.4 FL (ref 6–12)
POTASSIUM SERPL-SCNC: 4.1 MMOL/L (ref 3.5–5.2)
PROT SERPL-MCNC: 8.3 G/DL (ref 6–8.5)
PROT UR QL STRIP: ABNORMAL
RBC # BLD AUTO: 4.99 10*6/MM3 (ref 4.14–5.8)
RBC # UR STRIP: NORMAL /HPF
REF LAB TEST METHOD: NORMAL
SODIUM SERPL-SCNC: 139 MMOL/L (ref 136–145)
SP GR UR STRIP: 1.01 (ref 1–1.03)
SQUAMOUS #/AREA URNS HPF: NORMAL /HPF
UROBILINOGEN UR QL STRIP: ABNORMAL
WBC # UR STRIP: NORMAL /HPF
WBC NRBC COR # BLD AUTO: 11.24 10*3/MM3 (ref 3.4–10.8)
WHOLE BLOOD HOLD COAG: NORMAL
WHOLE BLOOD HOLD SPECIMEN: NORMAL

## 2024-08-12 PROCEDURE — 81001 URINALYSIS AUTO W/SCOPE: CPT | Performed by: EMERGENCY MEDICINE

## 2024-08-12 PROCEDURE — 25810000003 SODIUM CHLORIDE 0.9 % SOLUTION: Performed by: EMERGENCY MEDICINE

## 2024-08-12 PROCEDURE — 25510000001 IOPAMIDOL 61 % SOLUTION: Performed by: EMERGENCY MEDICINE

## 2024-08-12 PROCEDURE — 96376 TX/PRO/DX INJ SAME DRUG ADON: CPT

## 2024-08-12 PROCEDURE — 96374 THER/PROPH/DIAG INJ IV PUSH: CPT

## 2024-08-12 PROCEDURE — 36415 COLL VENOUS BLD VENIPUNCTURE: CPT

## 2024-08-12 PROCEDURE — 80053 COMPREHEN METABOLIC PANEL: CPT | Performed by: EMERGENCY MEDICINE

## 2024-08-12 PROCEDURE — 83605 ASSAY OF LACTIC ACID: CPT | Performed by: EMERGENCY MEDICINE

## 2024-08-12 PROCEDURE — 74177 CT ABD & PELVIS W/CONTRAST: CPT

## 2024-08-12 PROCEDURE — 83690 ASSAY OF LIPASE: CPT | Performed by: EMERGENCY MEDICINE

## 2024-08-12 PROCEDURE — 25010000002 DROPERIDOL PER 5 MG: Performed by: EMERGENCY MEDICINE

## 2024-08-12 PROCEDURE — 99285 EMERGENCY DEPT VISIT HI MDM: CPT

## 2024-08-12 PROCEDURE — 85025 COMPLETE CBC W/AUTO DIFF WBC: CPT | Performed by: EMERGENCY MEDICINE

## 2024-08-12 RX ORDER — DROPERIDOL 2.5 MG/ML
2.5 INJECTION, SOLUTION INTRAMUSCULAR; INTRAVENOUS ONCE
Status: COMPLETED | OUTPATIENT
Start: 2024-08-12 | End: 2024-08-12

## 2024-08-12 RX ORDER — SODIUM CHLORIDE 9 MG/ML
10 INJECTION, SOLUTION INTRAMUSCULAR; INTRAVENOUS; SUBCUTANEOUS AS NEEDED
Status: DISCONTINUED | OUTPATIENT
Start: 2024-08-12 | End: 2024-08-12 | Stop reason: HOSPADM

## 2024-08-12 RX ORDER — PROMETHAZINE HYDROCHLORIDE 25 MG/1
25 SUPPOSITORY RECTAL ONCE
Qty: 1 SUPPOSITORY | Refills: 0 | Status: SHIPPED | OUTPATIENT
Start: 2024-08-12 | End: 2024-08-12

## 2024-08-12 RX ADMIN — IOPAMIDOL 84 ML: 612 INJECTION, SOLUTION INTRAVENOUS at 14:57

## 2024-08-12 RX ADMIN — DROPERIDOL 2.5 MG: 2.5 INJECTION, SOLUTION INTRAMUSCULAR; INTRAVENOUS at 13:10

## 2024-08-12 RX ADMIN — SODIUM CHLORIDE 1000 ML: 9 INJECTION, SOLUTION INTRAVENOUS at 13:11

## 2024-08-12 RX ADMIN — DROPERIDOL 2.5 MG: 2.5 INJECTION, SOLUTION INTRAMUSCULAR; INTRAVENOUS at 17:25

## 2024-08-12 NOTE — ED PROVIDER NOTES
[facility]    EMERGENCY DEPARTMENT ENCOUNTER      Pt Name: Nicko Smith  MRN: [id number]  YOB: 1965  Date of evaluation: 8/12/2024  Provider: Paulino Jackson MD    CHIEF COMPLAINT       Chief Complaint   Patient presents with    Vomiting         HISTORY OF PRESENT ILLNESS   Nicko Smith is a 59 y.o. male who presents to the emergency department with generalized intermittent abdominal cramping and vomiting over the past several days. Patient previously evaluated at  and had unremrakble workup, fetl to have viral syndrome, however symptoms have persisted. No fever, chills, diarrhea, urinary symptoms.       Nursing notes were reviewed.    REVIEW OF SYSTEMS     ROS:  A chief complaint appropriate review of systems was completed and is negative except as noted in the HPI.      PAST MEDICAL HISTORY     Past Medical History:   Diagnosis Date    Anxiety     Depression     Low back pain          SURGICAL HISTORY       Past Surgical History:   Procedure Laterality Date    BICEPS TENDON REPAIR           CURRENT MEDICATIONS     No current facility-administered medications for this encounter.    Current Outpatient Medications:     albuterol sulfate  (90 Base) MCG/ACT inhaler, Inhale 2 puffs Every 4 (Four) Hours As Needed for Wheezing or Shortness of Air., Disp: 18 g, Rfl: 0    cyclobenzaprine (FLEXERIL) 10 MG tablet, , Disp: , Rfl:     Eliquis 2.5 MG tablet tablet, , Disp: , Rfl:     gabapentin (NEURONTIN) 300 MG capsule, Take 1 capsule by mouth 3 times a day., Disp: , Rfl:     sertraline (ZOLOFT) 50 MG tablet, , Disp: , Rfl:     traZODone (DESYREL) 50 MG tablet, , Disp: , Rfl:     ALLERGIES     Patient has no known allergies.    FAMILY HISTORY     No family history on file.       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  HEENT: Conjunctivae normal.  Neck: Trachea midline.  Cardiac: Heart regular rate, rhythm, no murmurs, rubs, or gallops  Lungs: Lungs are clear to auscultation, there is no wheezing, rhonchi, or rales. There is no use of accessory muscles.  Chest wall: There is no tenderness to palpation over the chest wall or over ribs  Abdomen: Abdomen is soft, nontender, nondistended. There are no firm or pulsatile masses, no rebound rigidity or guarding.   Musculoskeletal: No deformity.  Neuro: Alert and oriented x 4.  Dermatology: Skin is warm and dry  Psych: Mentation is grossly normal, cognition is grossly normal. Affect is appropriate.        DIAGNOSTIC RESULTS     EKG: All EKGs are interpreted by the Emergency Department Physician who either signs or Co-signs this chart in the absence of a cardiologist.    No orders to display         RADIOLOGY:   [x] Radiologist's Report Reviewed:  CT Abdomen Pelvis With Contrast   Final Result   Impression:   1.Cholelithiasis without CT evidence of cholecystitis.   2.0.3 cm hypodense focus in the right hepatic lobe, statistically most likely a small cyst but too small for definitive characterization with CT.   3.0.2 cm nonobstructing right renal stone.   4.No acute inflammatory change identified in the abdomen or pelvis.   5.Mild prostatomegaly.   6.0.7 cm hypodense focus in the mid left kidney, statistically most likely a small cyst  but too small for definitive characterization with CT.            Electronically Signed: Ralf Knapp MD     8/12/2024 3:24 PM EDT     Workstation ID: [id number]          I ordered and independently reviewed the above noted radiographic studies.        LABS:    I have reviewed and interpreted all of the currently available lab results from this visit (if applicable):  Results for orders placed or performed during the hospital encounter of 08/12/24   Comprehensive Metabolic Panel    Specimen: Blood   Result Value Ref Range    Glucose 148 (H) 65 - 99 mg/dL    BUN 21 (H) 6 - 20 mg/dL    Creatinine 1.31 (H) 0.76 - 1.27 mg/dL    Sodium 139 136 - 145 mmol/L    Potassium 4.1 3.5 - 5.2 mmol/L    Chloride 100 98 - 107 mmol/L    CO2 28.0 22.0 - 29.0 mmol/L    Calcium 10.2 8.6 - 10.5 mg/dL    Total Protein 8.3 6.0 - 8.5 g/dL    Albumin 4.8 3.5 - 5.2 g/dL    ALT (SGPT) 67 (H) 1 - 41 U/L    AST (SGOT) 49 (H) 1 - 40 U/L    Alkaline Phosphatase 87 39 - 117 U/L    Total Bilirubin 0.7 0.0 - 1.2 mg/dL    Globulin 3.5 gm/dL    A/G Ratio 1.4 g/dL    BUN/Creatinine Ratio 16.0 7.0 - 25.0    Anion Gap 11.0 5.0 - 15.0 mmol/L    eGFR 62.7 >60.0 mL/min/1.73   Lipase    Specimen: Blood   Result Value Ref Range    Lipase 70 (H) 13 - 60 U/L   Urinalysis With Microscopic If Indicated (No Culture) - Urine, Clean Catch    Specimen: Urine, Clean Catch   Result Value Ref Range    Color, UA Yellow Yellow, Straw    Appearance, UA Clear Clear    pH, UA 7.0 5.0 - 8.0    Specific Gravity, UA 1.012 1.001 - 1.030    Glucose, UA Negative Negative    Ketones, UA Negative Negative    Bilirubin, UA Negative Negative    Blood, UA Negative Negative    Protein,  mg/dL (2+) (A) Negative    Leuk Esterase, UA Negative Negative    Nitrite, UA Negative Negative    Urobilinogen, UA 0.2 E.U./dL 0.2 - 1.0 E.U./dL   Lactic Acid, Plasma    Specimen: Blood   Result Value Ref Range    Lactate 2.2 (C) 0.5 - 2.0 mmol/L   CBC Auto Differential    Specimen: Blood   Result  Value Ref Range    WBC 11.24 (H) 3.40 - 10.80 10*3/mm3    RBC 4.99 4.14 - 5.80 10*6/mm3    Hemoglobin 15.9 13.0 - 17.7 g/dL    Hematocrit 45.2 37.5 - 51.0 %    MCV 90.6 79.0 - 97.0 fL    MCH 31.9 26.6 - 33.0 pg    MCHC 35.2 31.5 - 35.7 g/dL    RDW 11.6 (L) 12.3 - 15.4 %    RDW-SD 38.5 37.0 - 54.0 fl    MPV 9.4 6.0 - 12.0 fL    Platelets 332 140 - 450 10*3/mm3    Neutrophil % 79.2 (H) 42.7 - 76.0 %    Lymphocyte % 14.9 (L) 19.6 - 45.3 %    Monocyte % 4.6 (L) 5.0 - 12.0 %    Eosinophil % 0.4 0.3 - 6.2 %    Basophil % 0.5 0.0 - 1.5 %    Immature Grans % 0.4 0.0 - 0.5 %    Neutrophils, Absolute 8.89 (H) 1.70 - 7.00 10*3/mm3    Lymphocytes, Absolute 1.68 0.70 - 3.10 10*3/mm3    Monocytes, Absolute 0.52 0.10 - 0.90 10*3/mm3    Eosinophils, Absolute 0.05 0.00 - 0.40 10*3/mm3    Basophils, Absolute 0.06 0.00 - 0.20 10*3/mm3    Immature Grans, Absolute 0.04 0.00 - 0.05 10*3/mm3    nRBC 0.0 0.0 - 0.2 /100 WBC   STAT Lactic Acid, Reflex    Specimen: Arm, Right; Blood   Result Value Ref Range    Lactate 1.0 0.5 - 2.0 mmol/L   Urinalysis, Microscopic Only - Urine, Clean Catch    Specimen: Urine, Clean Catch   Result Value Ref Range    RBC, UA 0-2 None Seen, 0-2 /HPF    WBC, UA 0-2 None Seen, 0-2 /HPF    Bacteria, UA None Seen None Seen, Trace /HPF    Squamous Epithelial Cells, UA None Seen None Seen, 0-2 /HPF    Hyaline Casts, UA None Seen 0 - 6 /LPF    Methodology Automated Microscopy    Green Top (Gel)   Result Value Ref Range    Extra Tube Hold for add-ons.    Lavender Top   Result Value Ref Range    Extra Tube hold for add-on    Gold Top - SST   Result Value Ref Range    Extra Tube Hold for add-ons.    Gray Top   Result Value Ref Range    Extra Tube Hold for add-ons.    Light Blue Top   Result Value Ref Range    Extra Tube Hold for add-ons.         If labs were ordered, I independently reviewed the results and considered them in treating the patient.      EMERGENCY DEPARTMENT COURSE and DIFFERENTIAL DIAGNOSIS/MDM:   Vitals:   AS OF 11:08 EDT    BP - 139/98  HR - 81  TEMP - 97.9 °F (36.6 °C) (Oral)  O2 SATS - 94%        Discussion below represents my analysis of pertinent findings related to patient's condition, differential diagnosis, treatment plan and final disposition.      Differential diagnosis:  The differential diagnosis associated with the patient's presentation includes: biliary pathology, pancreatitis, sbo, appendicitis, dehydration      Independent interpretations (ECG/rhythm strip/X-ray/US/CT scan): I independently interpreted the pt abd CT and cardiacm onitor - there is no obstructive change and the pt is in  NSR      Additional sources:  Discussed/obtained information from independent historians:   [] Spouse:   [] Parent:   [] Friend:   [] EMS:   [] Other:  External (non-ED) record review:   [] Inpatient record:   [] Office record:   [] Outpatient record:   [] Prior Outpatient labs:   [] Prior Outpatient radiology:   [] Primary Care record:   [] Outside ED record:   [x] Other: Reviewed labs from outside ED visit - WNL      Care significantly affected by Social Determinants of Health (housing and economic circumstances, unemployment)    [] Yes     [x] No   If yes, Patient's care significantly limited by  Social Determinants of Health including:    [] Inadequate housing    [] Low income    [] Alcoholism and drug addiction in family    [] Problems related to primary support group    [] Unemployment    [] Problems related to employment    [] Other Social Determinants of Health:       I considered prescription management with:    [] Pain medication:   [] Antiviral:   [] Antibiotic:   [x] Other: Pt given IV droperidol with significant improvement in symptoms    ED Course:    ED Course as of 08/15/24 1108   Mon Aug 12, 2024   1727 On reexamination, the patient feels significantly better.  He is resting comfortably in bed.  CT abdomen and pelvis demonstrates no significant intra-abdominal pathology.  Labs show no significant  leukocytosis.  He has some findings consistent with dehydration including minimally elevated LFTs, increased creatinine, and initially elevated lactate that cleared on recheck.  Cholelithiasis but no evidence of cholecystitis.  His abdomen was soft and nontender on exam and there were no exam findings or CT findings that would suggest acute cholecystitis.  Feel that he is stable for discharge home with symptomatic management. [NS]      ED Course User Index  [NS] Paulino Jackson MD         I had a discussion with the patient/family regarding diagnosis, diagnostic results, treatment plan, and medications.  The patient/family indicated understanding of these instructions.  I spent adequate time at the bedside preceding discharge necessary to personally discuss the aftercare instructions, giving patient education, providing explanations of the results of our evaluations/findings, and my decision making to assure that the patient/family understand the plan of care.  Time was allotted to answer questions at that time and throughout the ED course.  Emphasis was placed on timely follow-up after discharge.  I also discussed the potential for the development of an acute emergent condition requiring further evaluation, admission, or even surgical intervention. I discussed that we found nothing during the visit today indicating the need for further workup, admission, or the presence of an unstable medical condition.  I encouraged the patient to return to the emergency department immediately for ANY concerns, worsening, new complaints, or if symptoms persist and unable to seek follow-up in a timely fashion.  The patient/family expressed understanding and agreement with this plan.  The patient will follow-up with their PCP in 1-2 days for reevaluation.           PROCEDURES:  Procedures    CRITICAL CARE TIME        FINAL IMPRESSION      1. Acute generalized abdominal pain    2. Nausea and vomiting, unspecified vomiting type     3. Abnormal LFTs          DISPOSITION/PLAN     ED Disposition       ED Disposition   Discharge    Condition   Stable    Comment   --                 Comment: Please note this report has been produced using speech recognition software.      Paulino Jackson MD  Attending Emergency Physician             Paulino Jackson MD  08/15/24 5231

## 2024-12-14 ENCOUNTER — HOSPITAL ENCOUNTER (EMERGENCY)
Facility: HOSPITAL | Age: 59
Discharge: HOME | End: 2024-12-15
Attending: EMERGENCY MEDICINE
Payer: COMMERCIAL

## 2024-12-14 ENCOUNTER — APPOINTMENT (OUTPATIENT)
Dept: CT IMAGING | Facility: HOSPITAL | Age: 59
End: 2024-12-14
Payer: COMMERCIAL

## 2024-12-14 DIAGNOSIS — I10 HYPERTENSION, UNSPECIFIED TYPE: ICD-10-CM

## 2024-12-14 DIAGNOSIS — R10.84 GENERALIZED ABDOMINAL PAIN: Primary | ICD-10-CM

## 2024-12-14 DIAGNOSIS — R11.2 NAUSEA AND VOMITING, UNSPECIFIED VOMITING TYPE: ICD-10-CM

## 2024-12-14 DIAGNOSIS — R74.8 ELEVATED LIVER ENZYMES: ICD-10-CM

## 2024-12-14 LAB
ALBUMIN SERPL-MCNC: 4.7 G/DL (ref 3.5–5.2)
ALBUMIN/GLOB SERPL: 1.5 G/DL
ALP SERPL-CCNC: 108 U/L (ref 39–117)
ALT SERPL W P-5'-P-CCNC: 58 U/L (ref 1–41)
ANION GAP SERPL CALCULATED.3IONS-SCNC: 17 MMOL/L (ref 5–15)
AST SERPL-CCNC: 44 U/L (ref 1–40)
BACTERIA UR QL AUTO: NORMAL /HPF
BASOPHILS # BLD AUTO: 0.14 10*3/MM3 (ref 0–0.2)
BASOPHILS NFR BLD AUTO: 1.4 % (ref 0–1.5)
BILIRUB SERPL-MCNC: 0.4 MG/DL (ref 0–1.2)
BILIRUB UR QL STRIP: NEGATIVE
BUN SERPL-MCNC: 19 MG/DL (ref 6–20)
BUN/CREAT SERPL: 15.2 (ref 7–25)
CALCIUM SPEC-SCNC: 10.1 MG/DL (ref 8.6–10.5)
CHLORIDE SERPL-SCNC: 104 MMOL/L (ref 98–107)
CLARITY UR: CLEAR
CO2 SERPL-SCNC: 21 MMOL/L (ref 22–29)
COLOR UR: YELLOW
CREAT SERPL-MCNC: 1.25 MG/DL (ref 0.76–1.27)
D-LACTATE SERPL-SCNC: 1.2 MMOL/L (ref 0.5–2)
D-LACTATE SERPL-SCNC: 3.2 MMOL/L (ref 0.5–2)
DEPRECATED RDW RBC AUTO: 38.5 FL (ref 37–54)
EGFRCR SERPLBLD CKD-EPI 2021: 66.3 ML/MIN/1.73
EOSINOPHIL # BLD AUTO: 0.3 10*3/MM3 (ref 0–0.4)
EOSINOPHIL NFR BLD AUTO: 2.9 % (ref 0.3–6.2)
ERYTHROCYTE [DISTWIDTH] IN BLOOD BY AUTOMATED COUNT: 11.7 % (ref 12.3–15.4)
GLOBULIN UR ELPH-MCNC: 3.1 GM/DL
GLUCOSE SERPL-MCNC: 149 MG/DL (ref 65–99)
GLUCOSE UR STRIP-MCNC: NEGATIVE MG/DL
HCT VFR BLD AUTO: 50 % (ref 37.5–51)
HGB BLD-MCNC: 17.6 G/DL (ref 13–17.7)
HGB UR QL STRIP.AUTO: NEGATIVE
HOLD SPECIMEN: NORMAL
HYALINE CASTS UR QL AUTO: NORMAL /LPF
IMM GRANULOCYTES # BLD AUTO: 0.03 10*3/MM3 (ref 0–0.05)
IMM GRANULOCYTES NFR BLD AUTO: 0.3 % (ref 0–0.5)
KETONES UR QL STRIP: NEGATIVE
LEUKOCYTE ESTERASE UR QL STRIP.AUTO: NEGATIVE
LIPASE SERPL-CCNC: 35 U/L (ref 13–60)
LYMPHOCYTES # BLD AUTO: 2.25 10*3/MM3 (ref 0.7–3.1)
LYMPHOCYTES NFR BLD AUTO: 22 % (ref 19.6–45.3)
MCH RBC QN AUTO: 31.8 PG (ref 26.6–33)
MCHC RBC AUTO-ENTMCNC: 35.2 G/DL (ref 31.5–35.7)
MCV RBC AUTO: 90.4 FL (ref 79–97)
MONOCYTES # BLD AUTO: 0.92 10*3/MM3 (ref 0.1–0.9)
MONOCYTES NFR BLD AUTO: 9 % (ref 5–12)
NEUTROPHILS NFR BLD AUTO: 6.61 10*3/MM3 (ref 1.7–7)
NEUTROPHILS NFR BLD AUTO: 64.4 % (ref 42.7–76)
NITRITE UR QL STRIP: NEGATIVE
NRBC BLD AUTO-RTO: 0 /100 WBC (ref 0–0.2)
PH UR STRIP.AUTO: 7 [PH] (ref 5–8)
PLATELET # BLD AUTO: 306 10*3/MM3 (ref 140–450)
PMV BLD AUTO: 9.4 FL (ref 6–12)
POTASSIUM SERPL-SCNC: 4.6 MMOL/L (ref 3.5–5.2)
PROT SERPL-MCNC: 7.8 G/DL (ref 6–8.5)
PROT UR QL STRIP: ABNORMAL
RBC # BLD AUTO: 5.53 10*6/MM3 (ref 4.14–5.8)
RBC # UR STRIP: NORMAL /HPF
REF LAB TEST METHOD: NORMAL
SODIUM SERPL-SCNC: 142 MMOL/L (ref 136–145)
SP GR UR STRIP: 1.02 (ref 1–1.03)
SQUAMOUS #/AREA URNS HPF: NORMAL /HPF
TROPONIN T SERPL HS-MCNC: 26 NG/L
UROBILINOGEN UR QL STRIP: ABNORMAL
WBC # UR STRIP: NORMAL /HPF
WBC NRBC COR # BLD AUTO: 10.25 10*3/MM3 (ref 3.4–10.8)
WHOLE BLOOD HOLD COAG: NORMAL
WHOLE BLOOD HOLD SPECIMEN: NORMAL

## 2024-12-14 PROCEDURE — 99285 EMERGENCY DEPT VISIT HI MDM: CPT

## 2024-12-14 PROCEDURE — 25010000002 LORAZEPAM PER 2 MG: Performed by: EMERGENCY MEDICINE

## 2024-12-14 PROCEDURE — 25510000001 IOPAMIDOL 61 % SOLUTION: Performed by: EMERGENCY MEDICINE

## 2024-12-14 PROCEDURE — 74177 CT ABD & PELVIS W/CONTRAST: CPT

## 2024-12-14 PROCEDURE — 25010000002 HYDROMORPHONE PER 4 MG: Performed by: EMERGENCY MEDICINE

## 2024-12-14 PROCEDURE — 96374 THER/PROPH/DIAG INJ IV PUSH: CPT

## 2024-12-14 PROCEDURE — 96375 TX/PRO/DX INJ NEW DRUG ADDON: CPT

## 2024-12-14 PROCEDURE — 83605 ASSAY OF LACTIC ACID: CPT | Performed by: EMERGENCY MEDICINE

## 2024-12-14 PROCEDURE — 36415 COLL VENOUS BLD VENIPUNCTURE: CPT

## 2024-12-14 PROCEDURE — 83690 ASSAY OF LIPASE: CPT | Performed by: EMERGENCY MEDICINE

## 2024-12-14 PROCEDURE — 25010000002 ONDANSETRON PER 1 MG: Performed by: EMERGENCY MEDICINE

## 2024-12-14 PROCEDURE — 85025 COMPLETE CBC W/AUTO DIFF WBC: CPT | Performed by: EMERGENCY MEDICINE

## 2024-12-14 PROCEDURE — 84484 ASSAY OF TROPONIN QUANT: CPT | Performed by: EMERGENCY MEDICINE

## 2024-12-14 PROCEDURE — 25810000003 SODIUM CHLORIDE 0.9 % SOLUTION: Performed by: EMERGENCY MEDICINE

## 2024-12-14 PROCEDURE — 25010000002 DIPHENHYDRAMINE PER 50 MG: Performed by: EMERGENCY MEDICINE

## 2024-12-14 PROCEDURE — 80053 COMPREHEN METABOLIC PANEL: CPT | Performed by: EMERGENCY MEDICINE

## 2024-12-14 PROCEDURE — 81001 URINALYSIS AUTO W/SCOPE: CPT | Performed by: EMERGENCY MEDICINE

## 2024-12-14 RX ORDER — DIPHENHYDRAMINE HYDROCHLORIDE 50 MG/ML
25 INJECTION INTRAMUSCULAR; INTRAVENOUS ONCE
Status: COMPLETED | OUTPATIENT
Start: 2024-12-14 | End: 2024-12-14

## 2024-12-14 RX ORDER — ONDANSETRON 2 MG/ML
4 INJECTION INTRAMUSCULAR; INTRAVENOUS
Status: DISCONTINUED | OUTPATIENT
Start: 2024-12-14 | End: 2024-12-15 | Stop reason: HOSPADM

## 2024-12-14 RX ORDER — SODIUM CHLORIDE 9 MG/ML
10 INJECTION, SOLUTION INTRAMUSCULAR; INTRAVENOUS; SUBCUTANEOUS AS NEEDED
Status: DISCONTINUED | OUTPATIENT
Start: 2024-12-14 | End: 2024-12-15 | Stop reason: HOSPADM

## 2024-12-14 RX ORDER — HYDROMORPHONE HYDROCHLORIDE 1 MG/ML
0.5 INJECTION, SOLUTION INTRAMUSCULAR; INTRAVENOUS; SUBCUTANEOUS
Status: DISCONTINUED | OUTPATIENT
Start: 2024-12-14 | End: 2024-12-15 | Stop reason: HOSPADM

## 2024-12-14 RX ORDER — METOCLOPRAMIDE HYDROCHLORIDE 5 MG/ML
10 INJECTION INTRAMUSCULAR; INTRAVENOUS ONCE
Status: COMPLETED | OUTPATIENT
Start: 2024-12-14 | End: 2024-12-15

## 2024-12-14 RX ORDER — LORAZEPAM 2 MG/ML
0.5 INJECTION INTRAMUSCULAR
Status: DISCONTINUED | OUTPATIENT
Start: 2024-12-14 | End: 2024-12-14

## 2024-12-14 RX ORDER — IOPAMIDOL 612 MG/ML
100 INJECTION, SOLUTION INTRAVASCULAR
Status: COMPLETED | OUTPATIENT
Start: 2024-12-14 | End: 2024-12-14

## 2024-12-14 RX ADMIN — HYDROMORPHONE HYDROCHLORIDE 0.5 MG: 1 INJECTION, SOLUTION INTRAMUSCULAR; INTRAVENOUS; SUBCUTANEOUS at 20:55

## 2024-12-14 RX ADMIN — SODIUM CHLORIDE 1000 ML: 9 INJECTION, SOLUTION INTRAVENOUS at 19:04

## 2024-12-14 RX ADMIN — ONDANSETRON 4 MG: 2 INJECTION INTRAMUSCULAR; INTRAVENOUS at 19:05

## 2024-12-14 RX ADMIN — IOPAMIDOL 75 ML: 612 INJECTION, SOLUTION INTRAVENOUS at 22:47

## 2024-12-14 RX ADMIN — LORAZEPAM 0.5 MG: 2 INJECTION INTRAMUSCULAR; INTRAVENOUS at 19:31

## 2024-12-14 RX ADMIN — DIPHENHYDRAMINE HYDROCHLORIDE 25 MG: 50 INJECTION INTRAMUSCULAR; INTRAVENOUS at 23:58

## 2024-12-15 VITALS
RESPIRATION RATE: 16 BRPM | HEIGHT: 70 IN | OXYGEN SATURATION: 94 % | HEART RATE: 71 BPM | BODY MASS INDEX: 27.2 KG/M2 | WEIGHT: 190 LBS | SYSTOLIC BLOOD PRESSURE: 182 MMHG | TEMPERATURE: 98 F | DIASTOLIC BLOOD PRESSURE: 98 MMHG

## 2024-12-15 LAB
GEN 5 1HR TROPONIN T REFLEX: 17 NG/L
TROPONIN T % DELTA: -35 %
TROPONIN T NUMERIC DELTA: -9 NG/L

## 2024-12-15 PROCEDURE — 96375 TX/PRO/DX INJ NEW DRUG ADDON: CPT

## 2024-12-15 PROCEDURE — 25010000002 METOCLOPRAMIDE PER 10 MG: Performed by: EMERGENCY MEDICINE

## 2024-12-15 RX ORDER — ONDANSETRON 4 MG/1
4 TABLET, FILM COATED ORAL EVERY 6 HOURS PRN
Qty: 6 TABLET | Refills: 0 | Status: SHIPPED | OUTPATIENT
Start: 2024-12-15 | End: 2024-12-15

## 2024-12-15 RX ORDER — METOCLOPRAMIDE 10 MG/1
10 TABLET ORAL
Qty: 6 TABLET | Refills: 0 | Status: SHIPPED | OUTPATIENT
Start: 2024-12-15

## 2024-12-15 RX ORDER — METOCLOPRAMIDE 10 MG/1
10 TABLET ORAL
Qty: 6 TABLET | Refills: 0 | Status: SHIPPED | OUTPATIENT
Start: 2024-12-15 | End: 2024-12-15

## 2024-12-15 RX ORDER — ONDANSETRON 4 MG/1
4 TABLET, FILM COATED ORAL EVERY 6 HOURS PRN
Qty: 6 TABLET | Refills: 0 | Status: SHIPPED | OUTPATIENT
Start: 2024-12-15

## 2024-12-15 RX ORDER — DIPHENHYDRAMINE HCL 25 MG
25 TABLET ORAL EVERY 6 HOURS PRN
Qty: 20 TABLET | Refills: 0 | Status: SHIPPED | OUTPATIENT
Start: 2024-12-15

## 2024-12-15 RX ORDER — DIPHENHYDRAMINE HCL 25 MG
25 TABLET ORAL EVERY 6 HOURS PRN
Qty: 20 TABLET | Refills: 0 | Status: SHIPPED | OUTPATIENT
Start: 2024-12-15 | End: 2024-12-15

## 2024-12-15 RX ADMIN — METOCLOPRAMIDE 10 MG: 5 INJECTION, SOLUTION INTRAMUSCULAR; INTRAVENOUS at 00:05

## 2024-12-15 NOTE — ED PROVIDER NOTES
Subjective   History of Present Illness  Patient is a pleasant 59-year-old male presents to the emergency department with abdominal pain, nausea, and vomiting.  Primary symptoms are the nausea and vomiting.  Symptoms began earlier this afternoon, approximately 4 to 5 hours ago.  He states that his daughter just came back from [facility] that she had the same symptoms when she first arrived home.  Denies chest pain, respiratory symptoms.  He has had chills and cold sweats but denies fever.  The abdominal discomfort is more of a diffuse upper abdominal discomfort consistent with the strain from the vomiting.  History and exam are both somewhat limited at this time secondary to patient's discomfort and active emesis.  Patient's sister is with him and supplies much of the history.        Review of Systems   All other systems reviewed and are negative.      Past Medical History:   Diagnosis Date    Anxiety     Depression     Low back pain        No Known Allergies    Past Surgical History:   Procedure Laterality Date    BICEPS TENDON REPAIR         No family history on file.    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   Constitutional:       General: He is in acute distress.      Appearance: He is well-developed. He is ill-appearing.   HENT:      Head: Normocephalic and atraumatic.   Eyes:      Conjunctiva/sclera: Conjunctivae normal.      Pupils: Pupils are equal, round, and reactive to light.   Cardiovascular:      Rate and Rhythm: Normal rate and regular rhythm.      Heart sounds: Normal heart sounds.    Pulmonary:      Effort: Pulmonary effort is normal. No respiratory distress.      Breath sounds: Normal breath sounds.   Abdominal:      General: Bowel sounds are normal. There is no distension.      Palpations: Abdomen is soft. There is no mass.      Tenderness: There is abdominal tenderness in the right upper quadrant, epigastric area and left upper quadrant. There is no rebound.      Hernia: No hernia is present.   Musculoskeletal:         General: Normal range of motion.      Cervical back: Normal range of motion and neck supple.   Skin:     General: Skin is warm and dry.      Capillary Refill: Capillary refill takes less than 2 seconds.   Neurological:      General: No focal deficit present.      Mental Status: He is alert and oriented to person, place, and time.   Psychiatric:         Mood and Affect: Mood is not anxious.         Procedures           ED Course      Recent Results (from the past 24 hours)   Comprehensive Metabolic Panel    Collection Time: 12/14/24  6:52 PM    Specimen: Blood   Result Value Ref Range    Glucose 149 (H) 65 - 99 mg/dL    BUN 19 6 - 20 mg/dL    Creatinine 1.25 0.76 - 1.27 mg/dL    Sodium 142 136 - 145 mmol/L    Potassium 4.6 3.5 - 5.2 mmol/L    Chloride 104 98 - 107 mmol/L    CO2 21.0 (L) 22.0 - 29.0 mmol/L    Calcium 10.1 8.6 - 10.5 mg/dL    Total Protein 7.8 6.0 - 8.5 g/dL    Albumin 4.7 3.5 - 5.2 g/dL    ALT (SGPT) 58 (H) 1 - 41 U/L    AST (SGOT) 44 (H) 1 - 40 U/L    Alkaline Phosphatase 108 39 - 117 U/L    Total Bilirubin 0.4 0.0 - 1.2 mg/dL    Globulin 3.1 gm/dL    A/G Ratio 1.5 g/dL    BUN/Creatinine Ratio 15.2 7.0 - 25.0    Anion Gap 17.0 (H) 5.0 - 15.0 mmol/L    eGFR 66.3 >60.0 mL/min/1.73   Lipase    Collection Time: 12/14/24  6:52 PM    Specimen: Blood   Result Value Ref Range    Lipase 35 13 - 60 U/L   Lactic Acid, Plasma    Collection Time: 12/14/24  6:52 PM    Specimen: Blood   Result Value Ref Range    Lactate 3.2 (C) 0.5 - 2.0 mmol/L   Green Top (Gel)     Collection Time: 12/14/24  6:52 PM   Result Value Ref Range    Extra Tube Hold for add-ons.    Lavender Top    Collection Time: 12/14/24  6:52 PM   Result Value Ref Range    Extra Tube hold for add-on    Gold Top - SST    Collection Time: 12/14/24  6:52 PM   Result Value Ref Range    Extra Tube Hold for add-ons.    Gray Top    Collection Time: 12/14/24  6:52 PM   Result Value Ref Range    Extra Tube Hold for add-ons.    Light Blue Top    Collection Time: 12/14/24  6:52 PM   Result Value Ref Range    Extra Tube Hold for add-ons.    CBC Auto Differential    Collection Time: 12/14/24  6:52 PM    Specimen: Blood   Result Value Ref Range    WBC 10.25 3.40 - 10.80 10*3/mm3    RBC 5.53 4.14 - 5.80 10*6/mm3    Hemoglobin 17.6 13.0 - 17.7 g/dL    Hematocrit 50.0 37.5 - 51.0 %    MCV 90.4 79.0 - 97.0 fL    MCH 31.8 26.6 - 33.0 pg    MCHC 35.2 31.5 - 35.7 g/dL    RDW 11.7 (L) 12.3 - 15.4 %    RDW-SD 38.5 37.0 - 54.0 fl    MPV 9.4 6.0 - 12.0 fL    Platelets 306 140 - 450 10*3/mm3    Neutrophil % 64.4 42.7 - 76.0 %    Lymphocyte % 22.0 19.6 - 45.3 %    Monocyte % 9.0 5.0 - 12.0 %    Eosinophil % 2.9 0.3 - 6.2 %    Basophil % 1.4 0.0 - 1.5 %    Immature Grans % 0.3 0.0 - 0.5 %    Neutrophils, Absolute 6.61 1.70 - 7.00 10*3/mm3    Lymphocytes, Absolute 2.25 0.70 - 3.10 10*3/mm3    Monocytes, Absolute 0.92 (H) 0.10 - 0.90 10*3/mm3    Eosinophils, Absolute 0.30 0.00 - 0.40 10*3/mm3    Basophils, Absolute 0.14 0.00 - 0.20 10*3/mm3    Immature Grans, Absolute 0.03 0.00 - 0.05 10*3/mm3    nRBC 0.0 0.0 - 0.2 /100 WBC   High Sensitivity Troponin T    Collection Time: 12/14/24  6:52 PM    Specimen: Blood   Result Value Ref Range    HS Troponin T 26 (H) <22 ng/L   Urinalysis With Microscopic If Indicated (No Culture) - Urine, Clean Catch    Collection Time: 12/14/24  9:22 PM    Specimen: Urine, Clean Catch   Result Value Ref Range    Color, UA Yellow Yellow, Straw    Appearance, UA Clear Clear    pH, UA 7.0 5.0 - 8.0    Specific Gravity,  UA 1.017 1.001 - 1.030    Glucose, UA Negative Negative    Ketones, UA Negative Negative    Bilirubin, UA Negative Negative    Blood, UA Negative Negative    Protein,  mg/dL (2+) (A) Negative    Leuk Esterase, UA Negative Negative    Nitrite, UA Negative Negative    Urobilinogen, UA 0.2 E.U./dL 0.2 - 1.0 E.U./dL   Urinalysis, Microscopic Only - Urine, Clean Catch    Collection Time: 12/14/24  9:22 PM    Specimen: Urine, Clean Catch   Result Value Ref Range    RBC, UA 0-2 None Seen, 0-2 /HPF    WBC, UA 0-2 None Seen, 0-2 /HPF    Bacteria, UA None Seen None Seen, Trace /HPF    Squamous Epithelial Cells, UA 0-2 None Seen, 0-2 /HPF    Hyaline Casts, UA 0-6 0 - 6 /LPF    Methodology Manual Light Microscopy    STAT Lactic Acid, Reflex    Collection Time: 12/14/24 10:05 PM    Specimen: Arm, Left; Blood   Result Value Ref Range    Lactate 1.2 0.5 - 2.0 mmol/L   High Sensitivity Troponin T 1Hr    Collection Time: 12/14/24 11:22 PM    Specimen: Arm, Left; Blood   Result Value Ref Range    HS Troponin T 17 <22 ng/L    Troponin T Delta -9 ng/L    Troponin T % Change -35 %     Note: In addition to lab results from this visit, the labs listed above may include labs taken at another facility or during a different encounter within the last 24 hours. Please correlate lab times with ED admission and discharge times for further clarification of the services performed during this visit.    CT Abdomen Pelvis With Contrast   Final Result   Impression:   1.Cholelithiasis.   2.Nonobstructing right lower pole renal stone.   3.No acute abdominal or pelvic abnormality.            Electronically Signed: Remberto Gunter MD     12/14/2024 11:08 PM EST     Workstation ID: [id number]        Vitals:    12/15/24 0114 12/15/24 0119 12/15/24 0124 12/15/24 0129   BP:       BP Location:       Pulse: 70 70 71 71   Resp:       Temp:       TempSrc:       SpO2: 97% 96% 94%    Weight:       Height:         Medications   Sodium Chloride (PF) 0.9 % 10 mL (has  no administration in time range)   ondansetron (ZOFRAN) injection 4 mg (4 mg Intravenous Given 12/14/24 1905)   HYDROmorphone (DILAUDID) injection 0.5 mg (0.5 mg Intravenous Given 12/14/24 2055)   sodium chloride 0.9 % bolus 1,000 mL (0 mL Intravenous Stopped 12/14/24 2055)   iopamidol (ISOVUE-300) 61 % injection 100 mL (75 mL Intravenous Given 12/14/24 2247)   diphenhydrAMINE (BENADRYL) injection 25 mg (25 mg Intravenous Given 12/14/24 2358)   metoclopramide (REGLAN) injection 10 mg (10 mg Intravenous Given 12/15/24 0005)     ECG/EMG Results (last 24 hours)       ** No results found for the last 24 hours. **          No orders to display                                                        Medical Decision Making  Problems Addressed:  Elevated liver enzymes: complicated acute illness or injury  Generalized abdominal pain: complicated acute illness or injury  Hypertension, unspecified type: complicated acute illness or injury  Nausea and vomiting, unspecified vomiting type: complicated acute illness or injury    Amount and/or Complexity of Data Reviewed  External Data Reviewed: notes.  Labs: ordered. Decision-making details documented in ED Course.  Radiology: ordered and independent interpretation performed. Decision-making details documented in ED Course.    Risk  OTC drugs.  Prescription drug management.          Final diagnoses:   Generalized abdominal pain   Nausea and vomiting, unspecified vomiting type   Hypertension, unspecified type   Elevated liver enzymes       ED Disposition  ED Disposition       ED Disposition   Discharge    Condition   Stable    Comment   --               DISCHARGE    Patient discharged in stable condition.    Reviewed implications of results, diagnosis, meds, responsibility to follow up, warning signs and symptoms of possible worsening, potential complications and reasons to return to ER.    Patient/Family voiced understanding of above instructions.    Discussed plan for discharge,  as there is no emergent indication for admission.  Pt/family is agreeable and understands need for follow up and possible repeat testing.  Pt/family is aware that discharge does not mean that nothing is wrong but that it indicates no emergency is currently present that requires admission and they must continue care with follow-up as given below or with a physician of their choice.     FOLLOW-UP  PATIENT CONNECTION - [facility] [address]  [phone]  Schedule an appointment as soon as possible for a visit       [facility] EMERGENCY DEPARTMENT  [address]  [facility] [id number] [phone]    If symptoms worsen    Your Primary Care Provider               Medication List        New Prescriptions      diphenhydrAMINE 25 MG tablet  Commonly known as: BENADRYL  Take 1 tablet by mouth Every 6 (Six) Hours As Needed for Itching.     metoclopramide 10 MG tablet  Commonly known as: REGLAN  Take 1 tablet by mouth 4 (Four) Times a Day Before Meals & at Bedtime As Needed (Nausea).     ondansetron 4 MG tablet  Commonly known as: ZOFRAN  Take 1 tablet by mouth Every 6 (Six) Hours As Needed for Nausea or Vomiting.               Where to Get Your Medications        These medications were sent to [facility] - [place], [place] - [address] - [phone] [facility] [phone] FX  [address], [facility] [address]      Phone: [phone]   diphenhydrAMINE 25 MG tablet  metoclopramide 10 MG tablet  ondansetron 4 MG tablet            Shakir Enriquez DO  12/15/24 0151

## 2025-03-21 ENCOUNTER — PATIENT ROUNDING (BHMG ONLY) (OUTPATIENT)
Dept: URGENT CARE | Facility: CLINIC | Age: 60
End: 2025-03-21
Payer: COMMERCIAL

## 2025-05-31 ENCOUNTER — PATIENT ROUNDING (BHMG ONLY) (OUTPATIENT)
Dept: URGENT CARE | Facility: CLINIC | Age: 60
End: 2025-05-31
Payer: COMMERCIAL